# Patient Record
Sex: FEMALE | URBAN - METROPOLITAN AREA
[De-identification: names, ages, dates, MRNs, and addresses within clinical notes are randomized per-mention and may not be internally consistent; named-entity substitution may affect disease eponyms.]

---

## 2022-03-10 ENCOUNTER — APPOINTMENT (RX ONLY)
Age: 82
Setting detail: DERMATOLOGY
End: 2022-03-10

## 2022-03-10 DIAGNOSIS — L82.1 OTHER SEBORRHEIC KERATOSIS: ICD-10-CM

## 2022-03-10 DIAGNOSIS — I87.2 VENOUS INSUFFICIENCY (CHRONIC) (PERIPHERAL): ICD-10-CM

## 2022-03-10 PROCEDURE — ? COUNSELING

## 2022-03-10 PROCEDURE — 99203 OFFICE O/P NEW LOW 30 MIN: CPT

## 2022-03-10 PROCEDURE — ? DIAGNOSIS COMMENT

## 2022-03-10 ASSESSMENT — LOCATION SIMPLE DESCRIPTION DERM
LOCATION SIMPLE: RIGHT FOREHEAD
LOCATION SIMPLE: RIGHT HAND
LOCATION SIMPLE: LEFT FOREHEAD
LOCATION SIMPLE: LEFT HAND
LOCATION SIMPLE: RIGHT PRETIBIAL REGION

## 2022-03-10 ASSESSMENT — LOCATION DETAILED DESCRIPTION DERM
LOCATION DETAILED: LEFT SUPERIOR FOREHEAD
LOCATION DETAILED: LEFT RADIAL DORSAL HAND
LOCATION DETAILED: LEFT INFERIOR LATERAL FOREHEAD
LOCATION DETAILED: RIGHT SUPERIOR FOREHEAD
LOCATION DETAILED: RIGHT DISTAL PRETIBIAL REGION
LOCATION DETAILED: RIGHT RADIAL DORSAL HAND
LOCATION DETAILED: RIGHT INFERIOR LATERAL FOREHEAD

## 2022-03-10 ASSESSMENT — LOCATION ZONE DERM
LOCATION ZONE: LEG
LOCATION ZONE: HAND
LOCATION ZONE: FACE

## 2022-03-10 NOTE — PROCEDURE: DIAGNOSIS COMMENT
Render Risk Assessment In Note?: no
Detail Level: Simple
Comment: she has flat SKs on the hands\\nShe has multiple SK's on the scalp and upper forehead. No evidence of hair loss (such as would be seen in FF alopecia.) No perifollicular scale. Notify clinic if lesions worsen or she develops hair loss
Comment: varicose and spider veins and hemosiderin deposition on right lower leg. This is consistent with stasis changes. This was discussed at some length. Recommended continuing Eucerin cream, protect from the sun and wear compression hose as often as she can (knee high ok after she heals from previous vascular surgery.) Discussed that it is difficult to treat the brown discoloration. She does not have a lot of edema but compression hose would likely still be helpful. I recommended wearing them on both legs

## 2022-03-10 NOTE — HPI: FREE FORM (INITIAL HISTORY)
What Brings You In Today? (This Is An Xx Year Old Patient Who Presents For...): discoloration
When Did You First Notice It? (The Patient First Noticed It...): 6 months ago
Where On Your Body Is It? (Located On...): right lower leg
Any Associated Symptoms? (The Symptoms Include.....): no pain or itching
What Previous Treatments Have You Tried? (The Patient Has Tried The Following Treatments...): Eucerin
Did Anything Happen To Make You Want To Come In For This Specifically Today? (The Specific Reason That The Patient Came In Today Was Because:): further evaluation and management\\Latoya had treatment of varicose veins last month at [facility]. She has been wearing compression hose for about one month. The brown discoloration has not changed (but the varicose veins have improved.)\\Latoya has also developed new smaller veins on the right foot.\\Latoya also has small bumps on the frontal hairline (she notes a \"ridge\" at the hairline.) She has not noted any hair loss nor any rash on the scalp. She has not noticed that her hairline has moved farther back.